# Patient Record
Sex: MALE | Race: WHITE | HISPANIC OR LATINO | URBAN - METROPOLITAN AREA
[De-identification: names, ages, dates, MRNs, and addresses within clinical notes are randomized per-mention and may not be internally consistent; named-entity substitution may affect disease eponyms.]

---

## 2017-04-20 ENCOUNTER — FOLLOW UP ESTABLISHED (OUTPATIENT)
Dept: URBAN - METROPOLITAN AREA CLINIC 10 | Facility: CLINIC | Age: 47
End: 2017-04-20
Payer: COMMERCIAL

## 2017-04-20 PROCEDURE — 92014 COMPRE OPH EXAM EST PT 1/>: CPT | Performed by: OPTOMETRIST

## 2017-04-20 ASSESSMENT — VISUAL ACUITY
OS: 20/25
OD: 20/20

## 2017-04-20 ASSESSMENT — KERATOMETRY
OS: 46.25
OD: 48.38

## 2017-04-20 ASSESSMENT — INTRAOCULAR PRESSURE
OD: 11
OS: 11

## 2018-05-08 ENCOUNTER — FOLLOW UP ESTABLISHED (OUTPATIENT)
Dept: URBAN - METROPOLITAN AREA CLINIC 10 | Facility: CLINIC | Age: 48
End: 2018-05-08
Payer: COMMERCIAL

## 2018-05-08 PROCEDURE — 92014 COMPRE OPH EXAM EST PT 1/>: CPT | Performed by: OPTOMETRIST

## 2018-05-08 PROCEDURE — 92025 CPTRIZED CORNEAL TOPOGRAPHY: CPT | Performed by: OPTOMETRIST

## 2018-05-08 PROCEDURE — 92015 DETERMINE REFRACTIVE STATE: CPT | Performed by: OPTOMETRIST

## 2018-05-08 ASSESSMENT — INTRAOCULAR PRESSURE
OS: 12
OD: 13

## 2018-05-08 ASSESSMENT — VISUAL ACUITY
OD: 20/20
OS: 20/25

## 2019-07-02 ENCOUNTER — FOLLOW UP ESTABLISHED (OUTPATIENT)
Dept: URBAN - METROPOLITAN AREA CLINIC 10 | Facility: CLINIC | Age: 49
End: 2019-07-02
Payer: COMMERCIAL

## 2019-07-02 PROCEDURE — 92014 COMPRE OPH EXAM EST PT 1/>: CPT | Performed by: OPTOMETRIST

## 2019-07-02 PROCEDURE — 92015 DETERMINE REFRACTIVE STATE: CPT | Performed by: OPTOMETRIST

## 2019-07-02 ASSESSMENT — VISUAL ACUITY
OS: 20/20
OD: 20/20

## 2019-07-02 ASSESSMENT — INTRAOCULAR PRESSURE
OD: 14
OS: 13

## 2019-07-02 ASSESSMENT — KERATOMETRY
OD: 48.25
OS: 46.25

## 2020-09-01 ENCOUNTER — FOLLOW UP ESTABLISHED (OUTPATIENT)
Dept: URBAN - METROPOLITAN AREA CLINIC 30 | Facility: CLINIC | Age: 50
End: 2020-09-01
Payer: COMMERCIAL

## 2020-09-01 PROCEDURE — 92134 CPTRZ OPH DX IMG PST SGM RTA: CPT | Performed by: OPTOMETRIST

## 2020-09-01 PROCEDURE — 92014 COMPRE OPH EXAM EST PT 1/>: CPT | Performed by: OPTOMETRIST

## 2020-09-01 PROCEDURE — 92025 CPTRIZED CORNEAL TOPOGRAPHY: CPT | Performed by: OPTOMETRIST

## 2020-09-01 ASSESSMENT — INTRAOCULAR PRESSURE
OS: 13
OD: 13

## 2020-09-01 ASSESSMENT — VISUAL ACUITY
OD: 20/25
OS: 20/30

## 2020-09-01 ASSESSMENT — KERATOMETRY
OS: 46.51
OD: 48.09

## 2021-09-03 ENCOUNTER — OFFICE VISIT (OUTPATIENT)
Dept: URBAN - METROPOLITAN AREA CLINIC 24 | Facility: CLINIC | Age: 51
End: 2021-09-03
Payer: COMMERCIAL

## 2021-09-03 DIAGNOSIS — H52.4 PRESBYOPIA: ICD-10-CM

## 2021-09-03 PROCEDURE — 92014 COMPRE OPH EXAM EST PT 1/>: CPT | Performed by: OPTOMETRIST

## 2021-09-03 ASSESSMENT — INTRAOCULAR PRESSURE
OD: 13
OS: 13

## 2021-09-03 ASSESSMENT — KERATOMETRY
OS: 46.40
OD: 47.95

## 2021-09-03 ASSESSMENT — VISUAL ACUITY
OS: 20/25
OD: 20/20

## 2021-09-03 NOTE — IMPRESSION/PLAN
Impression: Bilateral keratoconus
--s/p PKP OS
--hx of crosslinking OD Plan: Stable OU c clear PKP OS. Pt. has been off steroids OS for years. No signs of rejection. Cont. to monitor off gtts. Discussed risks and signs/symptoms of rejection. Cont. to monitor yearly. RTC for changes.

## 2021-09-03 NOTE — IMPRESSION/PLAN
Impression: Presbyopia: H52.4. Plan: New srx released per patient request.
Understands BCVA limited.

## 2021-09-21 ENCOUNTER — OFFICE VISIT (OUTPATIENT)
Dept: URBAN - METROPOLITAN AREA CLINIC 30 | Facility: CLINIC | Age: 51
End: 2021-09-21
Payer: COMMERCIAL

## 2021-09-21 PROCEDURE — 65222 REMOVE FOREIGN BODY FROM EYE: CPT | Performed by: OPTOMETRIST

## 2021-09-21 RX ORDER — OFLOXACIN 3 MG/ML
0.3 % SOLUTION/ DROPS OPHTHALMIC
Qty: 5 | Refills: 0 | Status: ACTIVE
Start: 2021-09-21

## 2021-09-21 RX ORDER — PREDNISOLONE ACETATE 10 MG/ML
1 % SUSPENSION/ DROPS OPHTHALMIC
Qty: 5 | Refills: 0 | Status: ACTIVE
Start: 2021-09-21

## 2021-09-21 ASSESSMENT — INTRAOCULAR PRESSURE
OS: 13
OD: 13

## 2021-09-21 NOTE — IMPRESSION/PLAN
Impression: Bilateral keratoconus
--s/p PKP OS
--hx of crosslinking OD Plan: Pt notes pain and photophobia OS x 2 weeks ago. 1 suture loose OS, removed today in office. Stable OU c clear PKP OS. Pt. has been off steroids OS for years. Start PA 1 % QID OS and Ofloxacin TID OS. No signs of rejection. Cont. to monitor off gtts. Discussed risks and signs/symptoms of rejection.  RTC 1 week FU

## 2021-09-21 NOTE — IMPRESSION/PLAN
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 Plan: See other notes. Loose suture removed without complication. Instilled 2 gtts proparacaine, removed with carisa scissors and forceps.

## 2021-09-28 ENCOUNTER — OFFICE VISIT (OUTPATIENT)
Dept: URBAN - METROPOLITAN AREA CLINIC 30 | Facility: CLINIC | Age: 51
End: 2021-09-28
Payer: COMMERCIAL

## 2021-09-28 DIAGNOSIS — Z94.7 CORNEAL TRANSPLANT STATUS: Primary | ICD-10-CM

## 2021-09-28 DIAGNOSIS — H18.603 KERATOCONUS, UNSPECIFIED, BILATERAL: ICD-10-CM

## 2021-09-28 PROCEDURE — 99213 OFFICE O/P EST LOW 20 MIN: CPT | Performed by: OPTOMETRIST

## 2021-09-28 ASSESSMENT — VISUAL ACUITY
OD: 20/25
OS: 20/25

## 2021-09-28 ASSESSMENT — INTRAOCULAR PRESSURE
OS: 14
OD: 13

## 2021-09-28 NOTE — IMPRESSION/PLAN
Impression: Bilateral keratoconus
--s/p PKP OS
--hx of crosslinking OD Plan: Much improved. Suture removed last visit. Stable OU c clear PKP OS. Pt. has been off steroids OS for years. Continue PA 1 % BID OS then D/c at the end of the week. Ofloxacin BID OS this week. No signs of rejection. Cont. to monitor off gtts. Discussed risks and signs/symptoms of rejection.

## 2022-09-08 ENCOUNTER — OFFICE VISIT (OUTPATIENT)
Dept: URBAN - METROPOLITAN AREA CLINIC 30 | Facility: CLINIC | Age: 52
End: 2022-09-08
Payer: COMMERCIAL

## 2022-09-08 DIAGNOSIS — H43.393 OTHER VITREOUS OPACITIES, BILATERAL: ICD-10-CM

## 2022-09-08 DIAGNOSIS — H52.4 PRESBYOPIA: ICD-10-CM

## 2022-09-08 DIAGNOSIS — H01.004 UNSPECIFIED BLEPHARITIS OF LEFT UPPER EYELID: ICD-10-CM

## 2022-09-08 DIAGNOSIS — H18.603 KERATOCONUS, UNSPECIFIED, BILATERAL: Primary | ICD-10-CM

## 2022-09-08 DIAGNOSIS — Z94.7 CORNEAL TRANSPLANT STATUS: ICD-10-CM

## 2022-09-08 DIAGNOSIS — H01.001 UNSPECIFIED BLEPHARITIS RIGHT UPPER EYELID: ICD-10-CM

## 2022-09-08 PROCEDURE — 92012 INTRM OPH EXAM EST PATIENT: CPT | Performed by: OPTOMETRIST

## 2022-09-08 PROCEDURE — 92134 CPTRZ OPH DX IMG PST SGM RTA: CPT | Performed by: OPTOMETRIST

## 2022-09-08 PROCEDURE — 92025 CPTRIZED CORNEAL TOPOGRAPHY: CPT | Performed by: OPTOMETRIST

## 2022-09-08 ASSESSMENT — KERATOMETRY
OS: 47.77
OD: 48.13

## 2022-09-08 ASSESSMENT — VISUAL ACUITY
OD: 20/20
OS: 20/20

## 2022-09-08 ASSESSMENT — INTRAOCULAR PRESSURE
OS: 14
OD: 12

## 2022-09-08 NOTE — IMPRESSION/PLAN
Impression: Unspecified blepharitis right upper eyelid, OU Plan: Symptoms remain controlled. Pt. will cont. lid hygiene and AT's only.

## 2022-09-08 NOTE — IMPRESSION/PLAN
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 Plan: Remains stable. Anshu documentation on oculus today.

## 2022-09-08 NOTE — IMPRESSION/PLAN
Impression: Bilateral keratoconus
--s/p PKP OS
--hx of crosslinking OD Plan: Again, much improved. Stable OU c clear PKP OS. Pt has been off steroids OS for years. Finished PA 1 % BID OS then D/c at the end of the week. Ofloxacin BID OS this week. No signs of rejection. Cont. to monitor off gtts. Discussed risks and signs/symptoms of rejection.

## 2023-09-07 ENCOUNTER — OFFICE VISIT (OUTPATIENT)
Dept: URBAN - METROPOLITAN AREA CLINIC 30 | Facility: CLINIC | Age: 53
End: 2023-09-07
Payer: COMMERCIAL

## 2023-09-07 DIAGNOSIS — H52.4 PRESBYOPIA: ICD-10-CM

## 2023-09-07 DIAGNOSIS — H01.001 UNSPECIFIED BLEPHARITIS RIGHT UPPER EYELID: ICD-10-CM

## 2023-09-07 DIAGNOSIS — H43.393 OTHER VITREOUS OPACITIES, BILATERAL: ICD-10-CM

## 2023-09-07 DIAGNOSIS — Z94.7 CORNEAL TRANSPLANT STATUS: ICD-10-CM

## 2023-09-07 DIAGNOSIS — H18.603 KERATOCONUS, UNSPECIFIED, BILATERAL: Primary | ICD-10-CM

## 2023-09-07 DIAGNOSIS — H01.004 UNSPECIFIED BLEPHARITIS OF LEFT UPPER EYELID: ICD-10-CM

## 2023-09-07 PROCEDURE — 92014 COMPRE OPH EXAM EST PT 1/>: CPT | Performed by: OPTOMETRIST

## 2023-09-07 ASSESSMENT — VISUAL ACUITY
OD: 20/30
OS: 20/25

## 2023-09-07 ASSESSMENT — KERATOMETRY
OD: 48.50
OS: 47.39

## 2023-09-07 ASSESSMENT — INTRAOCULAR PRESSURE
OS: 14
OD: 12

## 2024-08-02 ENCOUNTER — OFFICE VISIT (OUTPATIENT)
Dept: URBAN - METROPOLITAN AREA CLINIC 30 | Facility: CLINIC | Age: 54
End: 2024-08-02
Payer: COMMERCIAL

## 2024-08-02 DIAGNOSIS — H18.603 KERATOCONUS, UNSPECIFIED, BILATERAL: Primary | ICD-10-CM

## 2024-08-02 DIAGNOSIS — T15.02XA FOREIGN BODY IN CORNEA, LEFT EYE: ICD-10-CM

## 2024-08-02 DIAGNOSIS — Z94.7 CORNEAL TRANSPLANT STATUS: ICD-10-CM

## 2024-08-02 PROCEDURE — 99213 OFFICE O/P EST LOW 20 MIN: CPT | Performed by: OPTOMETRIST

## 2024-08-02 ASSESSMENT — INTRAOCULAR PRESSURE
OS: 15
OD: 13

## 2024-09-09 ENCOUNTER — OFFICE VISIT (OUTPATIENT)
Dept: URBAN - METROPOLITAN AREA CLINIC 30 | Facility: CLINIC | Age: 54
End: 2024-09-09
Payer: COMMERCIAL

## 2024-09-09 DIAGNOSIS — H52.4 PRESBYOPIA: ICD-10-CM

## 2024-09-09 DIAGNOSIS — H18.603 KERATOCONUS, UNSPECIFIED, BILATERAL: Primary | ICD-10-CM

## 2024-09-09 DIAGNOSIS — H43.393 OTHER VITREOUS OPACITIES, BILATERAL: ICD-10-CM

## 2024-09-09 DIAGNOSIS — H52.13 MYOPIA, BILATERAL: ICD-10-CM

## 2024-09-09 DIAGNOSIS — T15.02XA FOREIGN BODY IN CORNEA, LEFT EYE: ICD-10-CM

## 2024-09-09 PROCEDURE — 92014 COMPRE OPH EXAM EST PT 1/>: CPT | Performed by: OPTOMETRIST

## 2024-09-09 ASSESSMENT — INTRAOCULAR PRESSURE
OS: 12
OD: 11

## 2024-09-09 ASSESSMENT — KERATOMETRY
OS: 46.75
OD: 48.00

## 2024-09-09 ASSESSMENT — VISUAL ACUITY
OD: 20/20
OS: 20/50

## 2024-10-28 ENCOUNTER — OFFICE VISIT (OUTPATIENT)
Dept: URBAN - METROPOLITAN AREA CLINIC 30 | Facility: CLINIC | Age: 54
End: 2024-10-28
Payer: COMMERCIAL

## 2024-10-28 DIAGNOSIS — T15.02XA FOREIGN BODY IN CORNEA, LEFT EYE: Primary | ICD-10-CM

## 2024-10-28 PROCEDURE — 65222 REMOVE FOREIGN BODY FROM EYE: CPT | Performed by: OPTOMETRIST
